# Patient Record
Sex: FEMALE | Race: WHITE | NOT HISPANIC OR LATINO | Employment: FULL TIME | URBAN - METROPOLITAN AREA
[De-identification: names, ages, dates, MRNs, and addresses within clinical notes are randomized per-mention and may not be internally consistent; named-entity substitution may affect disease eponyms.]

---

## 2017-04-13 ENCOUNTER — HOSPITAL ENCOUNTER (EMERGENCY)
Facility: MEDICAL CENTER | Age: 52
End: 2017-04-13
Attending: EMERGENCY MEDICINE
Payer: COMMERCIAL

## 2017-04-13 ENCOUNTER — APPOINTMENT (OUTPATIENT)
Dept: RADIOLOGY | Facility: MEDICAL CENTER | Age: 52
End: 2017-04-13
Attending: EMERGENCY MEDICINE
Payer: COMMERCIAL

## 2017-04-13 VITALS
SYSTOLIC BLOOD PRESSURE: 138 MMHG | OXYGEN SATURATION: 96 % | HEART RATE: 78 BPM | HEIGHT: 69 IN | BODY MASS INDEX: 26.97 KG/M2 | DIASTOLIC BLOOD PRESSURE: 78 MMHG | WEIGHT: 182.1 LBS | RESPIRATION RATE: 16 BRPM | TEMPERATURE: 98.1 F

## 2017-04-13 DIAGNOSIS — M54.50 ACUTE LEFT-SIDED LOW BACK PAIN WITHOUT SCIATICA: ICD-10-CM

## 2017-04-13 DIAGNOSIS — R10.9 FLANK PAIN: ICD-10-CM

## 2017-04-13 LAB
ALBUMIN SERPL BCP-MCNC: 3.7 G/DL (ref 3.2–4.9)
ALBUMIN/GLOB SERPL: 1.1 G/DL
ALP SERPL-CCNC: 81 U/L (ref 30–99)
ALT SERPL-CCNC: 22 U/L (ref 2–50)
ANION GAP SERPL CALC-SCNC: 6 MMOL/L (ref 0–11.9)
APPEARANCE UR: CLEAR
AST SERPL-CCNC: 21 U/L (ref 12–45)
BASOPHILS # BLD AUTO: 1 % (ref 0–1.8)
BASOPHILS # BLD: 0.05 K/UL (ref 0–0.12)
BILIRUB SERPL-MCNC: 0.6 MG/DL (ref 0.1–1.5)
BILIRUB UR QL STRIP.AUTO: NEGATIVE
BUN SERPL-MCNC: 15 MG/DL (ref 8–22)
CALCIUM SERPL-MCNC: 8.4 MG/DL (ref 8.4–10.2)
CHLORIDE SERPL-SCNC: 108 MMOL/L (ref 96–112)
CO2 SERPL-SCNC: 24 MMOL/L (ref 20–33)
COLOR UR: YELLOW
CREAT SERPL-MCNC: 0.89 MG/DL (ref 0.5–1.4)
CULTURE IF INDICATED INDCX: NO UA CULTURE
EOSINOPHIL # BLD AUTO: 0.1 K/UL (ref 0–0.51)
EOSINOPHIL NFR BLD: 1.9 % (ref 0–6.9)
ERYTHROCYTE [DISTWIDTH] IN BLOOD BY AUTOMATED COUNT: 45 FL (ref 35.9–50)
GFR SERPL CREATININE-BSD FRML MDRD: >60 ML/MIN/1.73 M 2
GLOBULIN SER CALC-MCNC: 3.3 G/DL (ref 1.9–3.5)
GLUCOSE SERPL-MCNC: 106 MG/DL (ref 65–99)
GLUCOSE UR STRIP.AUTO-MCNC: NEGATIVE MG/DL
HCT VFR BLD AUTO: 42.3 % (ref 37–47)
HGB BLD-MCNC: 14.6 G/DL (ref 12–16)
IMM GRANULOCYTES # BLD AUTO: 0.01 K/UL (ref 0–0.11)
IMM GRANULOCYTES NFR BLD AUTO: 0.2 % (ref 0–0.9)
KETONES UR STRIP.AUTO-MCNC: NEGATIVE MG/DL
LEUKOCYTE ESTERASE UR QL STRIP.AUTO: NEGATIVE
LYMPHOCYTES # BLD AUTO: 1.42 K/UL (ref 1–4.8)
LYMPHOCYTES NFR BLD: 27.6 % (ref 22–41)
MCH RBC QN AUTO: 31.2 PG (ref 27–33)
MCHC RBC AUTO-ENTMCNC: 34.5 G/DL (ref 33.6–35)
MCV RBC AUTO: 90.4 FL (ref 81.4–97.8)
MICRO URNS: NORMAL
MONOCYTES # BLD AUTO: 0.46 K/UL (ref 0–0.85)
MONOCYTES NFR BLD AUTO: 8.9 % (ref 0–13.4)
NEUTROPHILS # BLD AUTO: 3.11 K/UL (ref 2–7.15)
NEUTROPHILS NFR BLD: 60.4 % (ref 44–72)
NITRITE UR QL STRIP.AUTO: NEGATIVE
NRBC # BLD AUTO: 0 K/UL
NRBC BLD AUTO-RTO: 0 /100 WBC
PH UR STRIP.AUTO: 5.5 [PH]
PLATELET # BLD AUTO: 229 K/UL (ref 164–446)
PMV BLD AUTO: 9.7 FL (ref 9–12.9)
POTASSIUM SERPL-SCNC: 3.8 MMOL/L (ref 3.6–5.5)
PROT SERPL-MCNC: 7 G/DL (ref 6–8.2)
PROT UR QL STRIP: NEGATIVE MG/DL
RBC # BLD AUTO: 4.68 M/UL (ref 4.2–5.4)
RBC UR QL AUTO: NEGATIVE
SODIUM SERPL-SCNC: 138 MMOL/L (ref 135–145)
SP GR UR STRIP.AUTO: 1.02
WBC # BLD AUTO: 5.2 K/UL (ref 4.8–10.8)

## 2017-04-13 PROCEDURE — 85025 COMPLETE CBC W/AUTO DIFF WBC: CPT

## 2017-04-13 PROCEDURE — 96374 THER/PROPH/DIAG INJ IV PUSH: CPT

## 2017-04-13 PROCEDURE — 81003 URINALYSIS AUTO W/O SCOPE: CPT

## 2017-04-13 PROCEDURE — 94760 N-INVAS EAR/PLS OXIMETRY 1: CPT

## 2017-04-13 PROCEDURE — 700111 HCHG RX REV CODE 636 W/ 250 OVERRIDE (IP): Performed by: EMERGENCY MEDICINE

## 2017-04-13 PROCEDURE — 80053 COMPREHEN METABOLIC PANEL: CPT

## 2017-04-13 PROCEDURE — 74176 CT ABD & PELVIS W/O CONTRAST: CPT

## 2017-04-13 PROCEDURE — 99284 EMERGENCY DEPT VISIT MOD MDM: CPT

## 2017-04-13 RX ORDER — METHYLPREDNISOLONE 4 MG/1
TABLET ORAL
Qty: 21 TAB | Refills: 0 | Status: SHIPPED | OUTPATIENT
Start: 2017-04-13 | End: 2018-06-28

## 2017-04-13 RX ORDER — KETOROLAC TROMETHAMINE 30 MG/ML
30 INJECTION, SOLUTION INTRAMUSCULAR; INTRAVENOUS ONCE
Status: COMPLETED | OUTPATIENT
Start: 2017-04-13 | End: 2017-04-13

## 2017-04-13 RX ORDER — IBUPROFEN 200 MG
200 TABLET ORAL EVERY 6 HOURS PRN
COMMUNITY

## 2017-04-13 RX ORDER — CYCLOBENZAPRINE HCL 10 MG
10 TABLET ORAL 3 TIMES DAILY PRN
Qty: 30 TAB | Refills: 0 | Status: SHIPPED | OUTPATIENT
Start: 2017-04-13

## 2017-04-13 RX ADMIN — KETOROLAC TROMETHAMINE 30 MG: 30 INJECTION, SOLUTION INTRAMUSCULAR; INTRAVENOUS at 05:57

## 2017-04-13 NOTE — ED AVS SNAPSHOT
Home Care Instructions                                                                                                                Sandy Rubio   MRN: [id number]    Department:  [facility], Emergency Dept   Date of Visit:  4/13/2017            [facility], Emergency Dept    [address]    [place] [address]    Phone:  [phone]      You were seen by     1. Irais Fox D.O.    2. Terrance King M.D.      Your Diagnosis Was     Flank pain     R10.9       These are the medications you received during your hospitalization from 04/13/2017 0523 to 04/13/2017 0639     Date/Time Order Dose Route Action    04/13/2017 0557 ketorolac (TORADOL) injection 30 mg Intravenous Given      Follow-up Information     1. Follow up with [facility], Emergency Dept.    Specialty:  Emergency Medicine    Why:  If symptoms worsen    Contact information    [address] [place] [id number] [phone]      Medication Information     Review all of your home medications and newly ordered medications with your primary doctor and/or pharmacist as soon as possible. Follow medication instructions as directed by your doctor and/or pharmacist.     Please keep your complete medication list with you and share with your physician. Update the information when medications are discontinued, doses are changed, or new medications (including over-the-counter products) are added; and carry medication information at all times in the event of emergency situations.               Medication List      START taking these medications        Instructions    Morning Afternoon Evening Bedtime    cyclobenzaprine 10 MG Tabs   Commonly known as:  FLEXERIL        Take 1 Tab by mouth 3 times a day as needed.   Dose:  10 mg                        methylPREDNISolone 4 MG Tabs   Commonly known as:  MEDROL        Take as per the package instructions.                          ASK your doctor about these medications        Instructions    Morning Afternoon Evening Bedtime    estradiol 0.05 MG/24HR Pttw   Commonly known as:  CARO PALACIOS        Apply 1 Patch to skin as directed every 7 days.   Dose:  1 Patch                        ibuprofen 200 MG Tabs   Commonly known as:  MOTRIN        Take 200 mg by mouth every 6 hours as needed.   Dose:  200 mg                             Where to Get Your Medications      You can get these medications from any pharmacy     Bring a paper prescription for each of these medications    - cyclobenzaprine 10 MG Tabs  - methylPREDNISolone 4 MG Tabs            Procedures and tests performed during your visit     CBC WITH DIFFERENTIAL    COMP METABOLIC PANEL    CT-RENAL COLIC EVALUATION(A/P W/O)    Cardiac Monitoring    ESTIMATED GFR    IV Saline Lock    Pulse Ox    URINALYSIS CULTURE, IF INDICATED        Discharge Instructions       Back Pain, Adult  Back pain is very common in adults. The cause of back pain is rarely dangerous and the pain often gets better over time. The cause of your back pain may not be known. Some common causes of back pain include:  · Strain of the muscles or ligaments supporting the spine.  · Wear and tear (degeneration) of the spinal disks.  · Arthritis.  · Direct injury to the back.  For many people, back pain may return. Since back pain is rarely dangerous, most people can learn to manage this condition on their own.  HOME CARE INSTRUCTIONS  Watch your back pain for any changes. The following actions may help to lessen any discomfort you are feeling:  · Remain active. It is stressful on your back to sit or  one place for long periods of time. Do not sit, drive, or  one place for more than 30 minutes at a time. Take short walks on even surfaces as soon as you are able. Try to increase the length of time you walk each day.  · Exercise regularly as directed by your health care provider. Exercise helps your back heal  faster. It also helps avoid future injury by keeping your muscles strong and flexible.  · Do not stay in bed. Resting more than 1-2 days can delay your recovery.  · Pay attention to your body when you bend and lift. The most comfortable positions are those that put less stress on your recovering back. Always use proper lifting techniques, including:  ¨ Bending your knees.  ¨ Keeping the load close to your body.  ¨ Avoiding twisting.  · Find a comfortable position to sleep. Use a firm mattress and lie on your side with your knees slightly bent. If you lie on your back, put a pillow under your knees.  · Avoid feeling anxious or stressed. Stress increases muscle tension and can worsen back pain. It is important to recognize when you are anxious or stressed and learn ways to manage it, such as with exercise.  · Take medicines only as directed by your health care provider. Over-the-counter medicines to reduce pain and inflammation are often the most helpful. Your health care provider may prescribe muscle relaxant drugs. These medicines help dull your pain so you can more quickly return to your normal activities and healthy exercise.  · Apply ice to the injured area:  ¨ Put ice in a plastic bag.  ¨ Place a towel between your skin and the bag.  ¨ Leave the ice on for 20 minutes, 2-3 times a day for the first 2-3 days. After that, ice and heat may be alternated to reduce pain and spasms.  · Maintain a healthy weight. Excess weight puts extra stress on your back and makes it difficult to maintain good posture.  SEEK MEDICAL CARE IF:  · You have pain that is not relieved with rest or medicine.  · You have increasing pain going down into the legs or buttocks.  · You have pain that does not improve in one week.  · You have night pain.  · You lose weight.  · You have a fever or chills.  SEEK IMMEDIATE MEDICAL CARE IF:   · You develop new bowel or bladder control problems.  · You have unusual weakness or numbness in your arms or  legs.  · You develop nausea or vomiting.  · You develop abdominal pain.  · You feel faint.     This information is not intended to replace advice given to you by your health care provider. Make sure you discuss any questions you have with your health care provider.     Document Released: 12/18/2006 Document Revised: 01/08/2016 Document Reviewed: 04/21/2015  [employer] Interactive Patient Education ©2016 [employer]            Patient Information     Patient Information    Following emergency treatment: all patient requiring follow-up care must return either to a private physician or a clinic if your condition worsens before you are able to obtain further medical attention, please return to the emergency room.     Billing Information    At [facility], we work to make the billing process streamlined for our patients.  Our Representatives are here to answer any questions you may have regarding your hospital bill.  If you have insurance coverage and have supplied your insurance information to us, we will submit a claim to your insurer on your behalf.  Should you have any questions regarding your bill, we can be reached online or by phone as follows:  Online: You are able pay your bills online or live chat with our representatives about any billing questions you may have. We are here to help Monday - Friday from 8:00am to 7:30pm and 9:00am - 12:00pm on Saturdays.  Please visit [email or web address]  for more information.   Phone:  [phone] or [phone]    Please note that your emergency physician, surgeon, pathologist, radiologist, anesthesiologist, and other specialists are not employed by [facility] and will therefore bill separately for their services.  Please contact them directly for any questions concerning their bills at the numbers below:     Emergency Physician Services:  [phone]  [facility]:  [phone]  [facility]:   [phone]  [facility]:  [phone]    1. Your final bill may vary from the amount quoted upon discharge if all procedures are not complete at that time, or if your doctor has additional procedures of which we are not aware. You will receive an additional bill if you return to the Emergency Department at [facility] for suture removal regardless of the facility of which the sutures were placed.     2. Please arrange for settlement of this account at the emergency registration.    3. All self-pay accounts are due in full at the time of treatment.  If you are unable to meet this obligation then payment is expected within 4-5 days.     4. If you have had radiology studies (CT, X-ray, Ultrasound, MRI), you have received a preliminary result during your emergency department visit. Please contact the radiology department [phone] to receive a copy of your final result. Please discuss the Final result with your primary physician or with the follow up physician provided.     Crisis Hotline:  [place] Crisis Hotline:  8-657-FNJUQPK or 1-693.811.3754  [place] Crisis Hotline:    1-864.623.3786 or 292-266-6358         ED Discharge Follow Up Questions    1. In order to provide you with very good care, we would like to follow up with a phone call in the next few days.  May we have your permission to contact you?     YES /  NO    2. What is the best phone number to call you? (       )_____-__________    3. What is the best time to call you?      Morning  /  Afternoon  /  Evening                   Patient Signature:  ____________________________________________________________    Date:  ____________________________________________________________

## 2017-04-13 NOTE — ED AVS SNAPSHOT
Fancorps Access Code: [email or web address]  Expires: 5/13/2017  6:39 AM    Fancorps  A secure, online tool to manage your health information     [employer] Fancorps® is a secure, online tool that connects you to your personalized health information from the privacy of your home -- day or night - making it very easy for you to manage your healthcare. Once the activation process is completed, you can even access your medical information using the Fancorps rita, which is available for free in the Apple Rita store or Google Play store.     Fancorps provides the following levels of access (as shown below):   My Chart Features   [facility] Primary Care Doctor [facility]  Specialists [facility]  Urgent  Care Non-[facility]  Primary Care  Doctor   Email your healthcare team securely and privately 24/7 X X X X   Manage appointments: schedule your next appointment; view details of past/upcoming appointments X      Request prescription refills. X      View recent personal medical records, including lab and immunizations X X X X   View health record, including health history, allergies, medications X X X X   Read reports about your outpatient visits, procedures, consult and ER notes X X X X   See your discharge summary, which is a recap of your hospital and/or ER visit that includes your diagnosis, lab results, and care plan. X X       How to register for Fancorps:  1. Go to  [email or web address].  2. Click on the Sign Up Now box, which takes you to the New Member Sign Up page. You will need to provide the following information:  a. Enter your Fancorps Access Code exactly as it appears at the top of this page. (You will not need to use this code after you’ve completed the sign-up process. If you do not sign up before the expiration date, you must request a new code.)   b. Enter your date of birth.   c. Enter your home email address.   d. Click Submit, and follow the next screen’s instructions.  3. Create a Fancorps ID. This will be your Fancorps  login ID and cannot be changed, so think of one that is secure and easy to remember.  4. Create a M[employer]friends password. You can change your password at any time.  5. Enter your Password Reset Question and Answer. This can be used at a later time if you forget your password.   6. Enter your e-mail address. This allows you to receive e-mail notifications when new information is available in Mappyfriends.  7. Click Sign Up. You can now view your health information.    For assistance activating your M[employer]friends account, call [phone]

## 2017-04-13 NOTE — ED NOTES
Chief Complaint   Patient presents with   • Flank Pain     Left, started a couple days ago, progressively worse, 8-9/10 pain   • Constipation     Last small BM yesterday AM, has been taking stool softeners and miralax, Hx of constipation

## 2017-04-13 NOTE — DISCHARGE INSTRUCTIONS
Back Pain, Adult  Back pain is very common in adults. The cause of back pain is rarely dangerous and the pain often gets better over time. The cause of your back pain may not be known. Some common causes of back pain include:  · Strain of the muscles or ligaments supporting the spine.  · Wear and tear (degeneration) of the spinal disks.  · Arthritis.  · Direct injury to the back.  For many people, back pain may return. Since back pain is rarely dangerous, most people can learn to manage this condition on their own.  HOME CARE INSTRUCTIONS  Watch your back pain for any changes. The following actions may help to lessen any discomfort you are feeling:  · Remain active. It is stressful on your back to sit or  one place for long periods of time. Do not sit, drive, or  one place for more than 30 minutes at a time. Take short walks on even surfaces as soon as you are able. Try to increase the length of time you walk each day.  · Exercise regularly as directed by your health care provider. Exercise helps your back heal faster. It also helps avoid future injury by keeping your muscles strong and flexible.  · Do not stay in bed. Resting more than 1-2 days can delay your recovery.  · Pay attention to your body when you bend and lift. The most comfortable positions are those that put less stress on your recovering back. Always use proper lifting techniques, including:  ¨ Bending your knees.  ¨ Keeping the load close to your body.  ¨ Avoiding twisting.  · Find a comfortable position to sleep. Use a firm mattress and lie on your side with your knees slightly bent. If you lie on your back, put a pillow under your knees.  · Avoid feeling anxious or stressed. Stress increases muscle tension and can worsen back pain. It is important to recognize when you are anxious or stressed and learn ways to manage it, such as with exercise.  · Take medicines only as directed by your health care provider. Over-the-counter  medicines to reduce pain and inflammation are often the most helpful. Your health care provider may prescribe muscle relaxant drugs. These medicines help dull your pain so you can more quickly return to your normal activities and healthy exercise.  · Apply ice to the injured area:  ¨ Put ice in a plastic bag.  ¨ Place a towel between your skin and the bag.  ¨ Leave the ice on for 20 minutes, 2-3 times a day for the first 2-3 days. After that, ice and heat may be alternated to reduce pain and spasms.  · Maintain a healthy weight. Excess weight puts extra stress on your back and makes it difficult to maintain good posture.  SEEK MEDICAL CARE IF:  · You have pain that is not relieved with rest or medicine.  · You have increasing pain going down into the legs or buttocks.  · You have pain that does not improve in one week.  · You have night pain.  · You lose weight.  · You have a fever or chills.  SEEK IMMEDIATE MEDICAL CARE IF:   · You develop new bowel or bladder control problems.  · You have unusual weakness or numbness in your arms or legs.  · You develop nausea or vomiting.  · You develop abdominal pain.  · You feel faint.     This information is not intended to replace advice given to you by your health care provider. Make sure you discuss any questions you have with your health care provider.     Document Released: 12/18/2006 Document Revised: 01/08/2016 Document Reviewed: 04/21/2015  [employer] Interactive Patient Education ©2016 [employer]

## 2017-04-13 NOTE — ED NOTES
Discharge instructions provided.  Rx x2 given and educated on how and when to take medications, Pt verbalized the understanding of discharge instructions to follow up with PCP and to return to ER if condition worsens.  Pt ambulated out of ER without difficulty.

## 2017-04-13 NOTE — ED PROVIDER NOTES
ED Provider Note    CHIEF COMPLAINT  Chief Complaint   Patient presents with   • Flank Pain     Left, started a couple days ago, progressively worse, 8-9/10 pain   • Constipation     Last small BM yesterday AM, has been taking stool softeners and miralax, Hx of constipation, hysterectomy, and appendectomy       HPI  Sandy Rubio is a 51 y.o. female who presents to the emergency department with a chief complaint of left flank pain/low back pain. The patient states the pain started several days ago. It's been intermittent since the time of onset. She does not recall any specific injury. She describes as a sharp stabbing crampy type pain. It started off as fairly mild but his progress to being fairly severe. She does not have any associated vomiting. No dysuria. No hematuria. No fevers. The patient thought that she may be constipated. She took some stool softeners and some laxatives hoping that this would relieve her symptoms. However despite these treatments her symptoms have continued.    REVIEW OF SYSTEMS  See HPI for further details. All other systems are negative.     PAST MEDICAL HISTORY  Past Medical History   Diagnosis Date   • Pain      migraine from back surgery   • Other specified disorder of intestines      ocas indigestion   • Bronchitis    • Other specified symptom associated with female genital organs        FAMILY HISTORY  History reviewed. No pertinent family history.    SOCIAL HISTORY  Social History     Social History   • Marital Status: Single     Spouse Name: N/A   • Number of Children: N/A   • Years of Education: N/A     Social History Main Topics   • Smoking status: Current Every Day Smoker -- 0.50 packs/day     Types: Cigarettes   • Smokeless tobacco: None      Comment: 1/2 ppd   • Alcohol Use: Yes      Comment: rare   • Drug Use: No   • Sexual Activity: Not Asked     Other Topics Concern   • None     Social History Narrative       SURGICAL HISTORY  Past Surgical History   Procedure  "Laterality Date   • Lumbar laminectomy diskectomy  1978     back   • Appendectomy       ruptured   • Arthroscopy, knee       right   • Other neurological surg     • Lumbar decompression     • Laparoscopic lysis of adhesions  4/4/2014     Performed by Power Bautista M.D. at [facility]   • Salpingo oophorectomy  4/4/2014     Performed by Power Bautista M.D. at [facility]   • Cystoscopy  4/4/2014     Performed by Power Bautista M.D. at [facility]   • Hysterectomy laparoscopy  4/4/2014     Performed by Power Bautista M.D. at [facility]       CURRENT MEDICATIONS  Home Medications     Reviewed by Whitney Rosa R.N. (Registered Nurse) on 04/13/17 at 0540  Med List Status: Complete    Medication Last Dose Status    estradiol (VIVELLE DOT) 0.05 MG/24HR PTTW 4/11/2017 Active    ibuprofen (MOTRIN) 200 MG Tab 4/12/2017 Active                ALLERGIES  No Known Allergies    PHYSICAL EXAM  VITAL SIGNS: /96 mmHg  Pulse 72  Temp(Src) 36.8 °C (98.3 °F)  Resp 16  Ht 1.753 m (5' 9\")  Wt 82.6 kg (182 lb 1.6 oz)  BMI 26.88 kg/m2  SpO2 96%  LMP        Constitutional: Well developed, Well nourished, No acute distress, Non-toxic appearance.   Neck: Normal range of motion, No tenderness, Supple, No stridor.   Cardiovascular: Normal heart rate, Normal rhythm, No murmurs, No rubs, No gallops. No pulsatile masses.  Thorax & Lungs: Normal breath sounds, No respiratory distress, No wheezing, No chest tenderness.   Abdomen: Bowel sounds normal, Soft, No tenderness, No masses, No pulsatile masses.   Skin: Warm, Dry, No erythema, No rash.   Back: Tenderness to palpation in the left CVA region and left lumbar paraspinal musculature. There is no midline spinal tenderness to palpation.  Extremities: Intact distal pulses, No edema, No tenderness, No cyanosis, No clubbing.   Neurologic: Alert & oriented x 3, Normal motor function, Normal sensory function, No focal deficits " noted.     EKG      RADIOLOGY/PROCEDURES  CT-RENAL COLIC EVALUATION(A/P W/O)   Final Result      Negative noncontrast CT of the abdomen and pelvis.      Prior appendectomy        Results for orders placed or performed during the hospital encounter of 04/13/17   CBC WITH DIFFERENTIAL   Result Value Ref Range    WBC 5.2 4.8 - 10.8 K/uL    RBC 4.68 4.20 - 5.40 M/uL    Hemoglobin 14.6 12.0 - 16.0 g/dL    Hematocrit 42.3 37.0 - 47.0 %    MCV 90.4 81.4 - 97.8 fL    MCH 31.2 27.0 - 33.0 pg    MCHC 34.5 33.6 - 35.0 g/dL    RDW 45.0 35.9 - 50.0 fL    Platelet Count 229 164 - 446 K/uL    MPV 9.7 9.0 - 12.9 fL    Neutrophils-Polys 60.40 44.00 - 72.00 %    Lymphocytes 27.60 22.00 - 41.00 %    Monocytes 8.90 0.00 - 13.40 %    Eosinophils 1.90 0.00 - 6.90 %    Basophils 1.00 0.00 - 1.80 %    Immature Granulocytes 0.20 0.00 - 0.90 %    Nucleated RBC 0.00 /100 WBC    Neutrophils (Absolute) 3.11 2.00 - 7.15 K/uL    Lymphs (Absolute) 1.42 1.00 - 4.80 K/uL    Monos (Absolute) 0.46 0.00 - 0.85 K/uL    Eos (Absolute) 0.10 0.00 - 0.51 K/uL    Baso (Absolute) 0.05 0.00 - 0.12 K/uL    Immature Granulocytes (abs) 0.01 0.00 - 0.11 K/uL    NRBC (Absolute) 0.00 K/uL   COMP METABOLIC PANEL   Result Value Ref Range    Sodium 138 135 - 145 mmol/L    Potassium 3.8 3.6 - 5.5 mmol/L    Chloride 108 96 - 112 mmol/L    Co2 24 20 - 33 mmol/L    Anion Gap 6.0 0.0 - 11.9    Glucose 106 (H) 65 - 99 mg/dL    Bun 15 8 - 22 mg/dL    Creatinine 0.89 0.50 - 1.40 mg/dL    Calcium 8.4 8.4 - 10.2 mg/dL    AST(SGOT) 21 12 - 45 U/L    ALT(SGPT) 22 2 - 50 U/L    Alkaline Phosphatase 81 30 - 99 U/L    Total Bilirubin 0.6 0.1 - 1.5 mg/dL    Albumin 3.7 3.2 - 4.9 g/dL    Total Protein 7.0 6.0 - 8.2 g/dL    Globulin 3.3 1.9 - 3.5 g/dL    A-G Ratio 1.1 g/dL   URINALYSIS CULTURE, IF INDICATED   Result Value Ref Range    Color Yellow     Character Clear     Specific Gravity 1.025 <1.035    Ph 5.5 5.0-8.0    Glucose Negative Negative mg/dL    Ketones Negative Negative mg/dL     Protein Negative Negative mg/dL    Bilirubin Negative Negative    Nitrite Negative Negative    Leukocyte Esterase Negative Negative    Occult Blood Negative Negative    Micro Urine Req see below     Culture Indicated No UA Culture   ESTIMATED GFR   Result Value Ref Range    GFR If African American >60 >60 mL/min/1.73 m 2    GFR If Non African American >60 >60 mL/min/1.73 m 2         COURSE & MEDICAL DECISION MAKING  Pertinent Labs & Imaging studies reviewed. (See chart for details)    The patient presents today with left flank pain/left low back pain. Urinalysis does not reveal any evidence of infection. CT scan demonstrates no evidence of ureterolithiasis. Metabolic panels markable for normal electrolytes. White blood cell count is normal.    Overall the patient's physical examination is reassuring. Feel that her symptoms are likely musculoskeletal in nature. The patient works as a  at [employer]. I will give the patient a prescription for Medrol and Flexeril. She has been instructed that she is not to take Flexeril within 12 hours of going to work. She verbalizes understanding. The patient will be given a work note for 2 days off. Condition the patient has weekend off. She will return to work on Monday.    The patient will return for new or worsening symptoms and is stable at the time of discharge.    The patient is referred to a primary physician for blood pressure management, diabetic screening, and for all other preventative health concerns.    DISPOSITION:  Patient will be discharged home in stable condition.    FOLLOW UP:  [facility], Emergency Dept  [address]  [place] [address]  [phone]    If symptoms worsen      OUTPATIENT MEDICATIONS:  New Prescriptions    CYCLOBENZAPRINE (FLEXERIL) 10 MG TAB    Take 1 Tab by mouth 3 times a day as needed.    METHYLPREDNISOLONE (MEDROL) 4 MG TAB    Take as per the package instructions.            FINAL IMPRESSION  1. Flank pain    2. Acute left-sided low back pain without sciatica        Electronically signed by: Terrance King, 4/13/2017 6:35 AM

## 2017-04-13 NOTE — ED AVS SNAPSHOT
4/13/2017    Sandy Staceyalcon Rubio  [address] [address]    Dear Sandy:    [facility] wants to ensure your discharge home is safe and you or your loved ones have had all of your questions answered regarding your care after you leave the hospital.    Below is a list of resources and contact information should you have any questions regarding your hospital stay, follow-up instructions, or active medical symptoms.    Questions or Concerns Regarding… Contact   Medical Questions Related to Your Discharge  (7 days a week, 8am-5pm) Contact a Nurse Care Coordinator   [phone]   Medical Questions Not Related to Your Discharge  (24 hours a day / 7 days a week)  Contact the Nurse Health Line   [phone]    Medications or Discharge Instructions Refer to your discharge packet   or contact your [facility] Primary Care Provider   [phone]   Follow-up Appointment(s) Schedule your appointment via [employer]   or contact Scheduling [phone]   Billing Review your statement via [employer]  or contact Billing [phone]   Medical Records Review your records via [employer]   or contact Medical Records [phone]     You may receive a telephone call within two days of discharge. This call is to make certain you understand your discharge instructions and have the opportunity to have any questions answered. You can also easily access your medical information, test results and upcoming appointments via the [employer] free online health management tool. You can learn more and sign up at [employer]. For assistance setting up your [employer] account, please call [phone].    Once again, we want to ensure your discharge home is safe and that you have a clear understanding of any next steps in your care. If you have any questions or concerns, please do not hesitate to contact us, we are here for you. Thank you for choosing [facility] for your healthcare needs.    Sincerely,    Your [facility] Healthcare Team

## 2017-07-06 ENCOUNTER — OFFICE VISIT (OUTPATIENT)
Dept: URGENT CARE | Facility: CLINIC | Age: 52
End: 2017-07-06
Payer: COMMERCIAL

## 2017-07-06 ENCOUNTER — HOSPITAL ENCOUNTER (EMERGENCY)
Facility: MEDICAL CENTER | Age: 52
End: 2017-07-06
Attending: EMERGENCY MEDICINE
Payer: COMMERCIAL

## 2017-07-06 VITALS
DIASTOLIC BLOOD PRESSURE: 102 MMHG | SYSTOLIC BLOOD PRESSURE: 170 MMHG | OXYGEN SATURATION: 97 % | TEMPERATURE: 98.8 F | RESPIRATION RATE: 14 BRPM | HEIGHT: 69 IN | HEART RATE: 81 BPM | BODY MASS INDEX: 26.81 KG/M2 | WEIGHT: 181 LBS

## 2017-07-06 VITALS
WEIGHT: 180.56 LBS | DIASTOLIC BLOOD PRESSURE: 88 MMHG | TEMPERATURE: 97.3 F | BODY MASS INDEX: 26.74 KG/M2 | SYSTOLIC BLOOD PRESSURE: 140 MMHG | HEIGHT: 69 IN | HEART RATE: 70 BPM | RESPIRATION RATE: 16 BRPM | OXYGEN SATURATION: 96 %

## 2017-07-06 DIAGNOSIS — R45.2 UNHAPPINESS: ICD-10-CM

## 2017-07-06 DIAGNOSIS — I10 ESSENTIAL HYPERTENSION: ICD-10-CM

## 2017-07-06 DIAGNOSIS — M79.606 LEG PAIN, ANTERIOR, UNSPECIFIED LATERALITY: ICD-10-CM

## 2017-07-06 DIAGNOSIS — F41.8 SITUATIONAL ANXIETY: ICD-10-CM

## 2017-07-06 DIAGNOSIS — R60.9 PERIPHERAL EDEMA: ICD-10-CM

## 2017-07-06 DIAGNOSIS — L55.9 SUNBURN: ICD-10-CM

## 2017-07-06 PROCEDURE — 99283 EMERGENCY DEPT VISIT LOW MDM: CPT

## 2017-07-06 PROCEDURE — 99203 OFFICE O/P NEW LOW 30 MIN: CPT | Performed by: PHYSICIAN ASSISTANT

## 2017-07-06 PROCEDURE — 93970 EXTREMITY STUDY: CPT

## 2017-07-06 RX ORDER — IBUPROFEN 600 MG/1
600 TABLET ORAL ONCE
Status: DISCONTINUED | OUTPATIENT
Start: 2017-07-06 | End: 2017-07-06 | Stop reason: HOSPADM

## 2017-07-06 ASSESSMENT — LIFESTYLE VARIABLES: DO YOU DRINK ALCOHOL: NO

## 2017-07-06 ASSESSMENT — PAIN SCALES - GENERAL
PAINLEVEL_OUTOF10: 5
PAINLEVEL_OUTOF10: 4

## 2017-07-06 ASSESSMENT — ENCOUNTER SYMPTOMS: LEG SWELLING: 1

## 2017-07-06 NOTE — ED AVS SNAPSHOT
Home Care Instructions                                                                                                                Sandy Rubio   MRN: [id number]    Department:  [facility], Emergency Dept   Date of Visit:  7/6/2017            [facility], Emergency Dept    [address]    [place] [address]    Phone:  [phone]      You were seen by     Harry Singh M.D.      Your Diagnosis Was     Leg pain, anterior, unspecified laterality     M79.606       These are the medications you received during your hospitalization from 07/06/2017 1946 to 07/06/2017 2131     Date/Time Order Dose Route Action    07/06/2017 2045 ibuprofen (MOTRIN) tablet 600 mg 600 mg Oral Refused      Medication Information     Review all of your home medications and newly ordered medications with your primary doctor and/or pharmacist as soon as possible. Follow medication instructions as directed by your doctor and/or pharmacist.     Please keep your complete medication list with you and share with your physician. Update the information when medications are discontinued, doses are changed, or new medications (including over-the-counter products) are added; and carry medication information at all times in the event of emergency situations.               Medication List      ASK your doctor about these medications        Instructions    Morning Afternoon Evening Bedtime    cyclobenzaprine 10 MG Tabs   Commonly known as:  FLEXERIL        Take 1 Tab by mouth 3 times a day as needed.   Dose:  10 mg                        estradiol 0.05 MG/24HR Pttw   Commonly known as:  VIVELLE DOT        Apply 1 Patch to skin as directed every 7 days.   Dose:  1 Patch                        ibuprofen 200 MG Tabs   Commonly known as:  MOTRIN        Take 200 mg by mouth every 6 hours as needed.   Dose:  200 mg                        methylPREDNISolone 4 MG Tabs   Commonly known as:  MEDROL        Take as  per the package instructions.                                Procedures and tests performed during your visit     LE VENOUS DUPLEX (Specify in Comments Left, Right Or Bilateral)        Discharge Instructions       The ultrasound of both of her lower legs was normal. There is no evidence of clot or any other abnormality. You discomfort is almost certainly from your sunburn. Take 600 mg of ibuprofen every 6 hours as needed. Aloe vera gel, applied the skin, can also be extremely helpful.     Sunburn  Sunburn is damage to the skin caused by overexposure to ultraviolet (UV) rays. People with light skin or a fair complexion may be more susceptible to sunburn. Repeated sun exposure causes early skin aging such as wrinkles and sun spots. It also increases the risk of skin cancer.  CAUSES  A sunburn is caused by getting too much UV radiation from the sun.  SYMPTOMS  · Red or pink skin.  · Soreness and swelling.  · Pain.  · Blisters.  · Peeling skin.  · Headache, fever, and fatigue if sunburn covers a large area.  TREATMENT  · Your caregiver may tell you to take certain medicines to lessen inflammation.  · Your caregiver may have you use hydrocortisone cream or spray to help with itching and inflammation.  · Your caregiver may prescribe an antibiotic cream to use on blisters.  HOME CARE INSTRUCTIONS   · Avoid further exposure to the sun.  · Cool baths and cool compresses may be helpful if used several times per day. Do not apply ice, since this may result in more damage to the skin.  · Only take over-the-counter or prescription medicines for pain, discomfort, or fever as directed by your caregiver.  · Use aloe or other over-the-counter sunburn creams or gels on your skin. Do not apply these creams or gels on blisters.  · Drink enough fluids to keep your urine clear or pale yellow.  · Do not break blisters. If blisters break, your caregiver may recommend an antibiotic cream to apply to the affected area.  PREVENTION   · Try to  avoid the sun between 10:00 a.m. and 4:00 p.m. when it is the strongest.  · Apply sunscreen at least 30 minutes before exposure to the sun.  · Always wear protective hats, clothing, and sunglasses with UV protection.  · Avoid medicines, herbs, and foods that increase your sensitivity to sunlight.  · Avoid tanning beds.  SEEK IMMEDIATE MEDICAL CARE IF:   · You have a fever.  · Your pain is uncontrolled with medicine.  · You start to vomit or have diarrhea.  · You feel faint or develop a headache with confusion.  · You develop severe blistering.  · You have a pus-like (purulent) discharge coming from the blisters.   · Your burn becomes more painful and swollen.  MAKE SURE YOU:  · Understand these instructions.  · Will watch your condition.  · Will get help right away if you are not doing well or get worse.     This information is not intended to replace advice given to you by your health care provider. Make sure you discuss any questions you have with your health care provider.     Document Released: 09/27/2006 Document Revised: 04/14/2014 Document Reviewed: 06/10/2012  [employer]You Interactive Patient Education ©2016 [employer]            Patient Information     Patient Information    Following emergency treatment: all patient requiring follow-up care must return either to a private physician or a clinic if your condition worsens before you are able to obtain further medical attention, please return to the emergency room.     Billing Information    At [facility], we work to make the billing process streamlined for our patients.  Our Representatives are here to answer any questions you may have regarding your hospital bill.  If you have insurance coverage and have supplied your insurance information to us, we will submit a claim to your insurer on your behalf.  Should you have any questions regarding your bill, we can be reached online or by phone as follows:  Online: You are able pay your bills online or live chat  with our representatives about any billing questions you may have. We are here to help Monday - Friday from 8:00am to 7:30pm and 9:00am - 12:00pm on Saturdays.  Please visit [email or web address]  for more information.   Phone:  [phone] or [phone]    Please note that your emergency physician, surgeon, pathologist, radiologist, anesthesiologist, and other specialists are not employed by [facility] and will therefore bill separately for their services.  Please contact them directly for any questions concerning their bills at the numbers below:     Emergency Physician Services:  [phone]  [facility]:  [phone]  [facility]:  [phone]  [facility]:  [phone]    1. Your final bill may vary from the amount quoted upon discharge if all procedures are not complete at that time, or if your doctor has additional procedures of which we are not aware. You will receive an additional bill if you return to the Emergency Department at [facility] for suture removal regardless of the facility of which the sutures were placed.     2. Please arrange for settlement of this account at the emergency registration.    3. All self-pay accounts are due in full at the time of treatment.  If you are unable to meet this obligation then payment is expected within 4-5 days.     4. If you have had radiology studies (CT, X-ray, Ultrasound, MRI), you have received a preliminary result during your emergency department visit. Please contact the radiology department [phone] to receive a copy of your final result. Please discuss the Final result with your primary physician or with the follow up physician provided.     Crisis Hotline:  [place] Crisis Hotline:  4-625-UIZTBFB or 1-314.380.3931  [place] Crisis Hotline:    1-308.814.1602 or 754-339-8366         ED Discharge Follow Up Questions    1. In order to provide you with very good care,  we would like to follow up with a phone call in the next few days.  May we have your permission to contact you?     YES /  NO    2. What is the best phone number to call you? (       )_____-__________    3. What is the best time to call you?      Morning  /  Afternoon  /  Evening                   Patient Signature:  ____________________________________________________________    Date:  ____________________________________________________________

## 2017-07-06 NOTE — ED AVS SNAPSHOT
Wenwo Access Code: Activation code not generated  Current Wenwo Status: Active    [employer]  A secure, online tool to manage your health information     [employer]’s Wenwo® is a secure, online tool that connects you to your personalized health information from the privacy of your home -- day or night - making it very easy for you to manage your healthcare. Once the activation process is completed, you can even access your medical information using the Wenwo rita, which is available for free in the Apple Rita store or Google Play store.     Wenwo provides the following levels of access (as shown below):   My Chart Features   [facility] Primary Care Doctor [facility]  Specialists [facility]  Urgent  Care Non-[facility]  Primary Care  Doctor   Email your healthcare team securely and privately 24/7 X X X X   Manage appointments: schedule your next appointment; view details of past/upcoming appointments X      Request prescription refills. X      View recent personal medical records, including lab and immunizations X X X X   View health record, including health history, allergies, medications X X X X   Read reports about your outpatient visits, procedures, consult and ER notes X X X X   See your discharge summary, which is a recap of your hospital and/or ER visit that includes your diagnosis, lab results, and care plan. X X       How to register for Wenwo:  1. Go to  [email or web address].  2. Click on the Sign Up Now box, which takes you to the New Member Sign Up page. You will need to provide the following information:  a. Enter your Wenwo Access Code exactly as it appears at the top of this page. (You will not need to use this code after you’ve completed the sign-up process. If you do not sign up before the expiration date, you must request a new code.)   b. Enter your date of birth.   c. Enter your home email address.   d. Click Submit, and follow the next screen’s instructions.  3. Create a Wenwo ID. This will  be your [employer] login ID and cannot be changed, so think of one that is secure and easy to remember.  4. Create a [employer] password. You can change your password at any time.  5. Enter your Password Reset Question and Answer. This can be used at a later time if you forget your password.   6. Enter your e-mail address. This allows you to receive e-mail notifications when new information is available in [employer].  7. Click Sign Up. You can now view your health information.    For assistance activating your [employer] account, call [phone]

## 2017-07-06 NOTE — ED AVS SNAPSHOT
7/6/2017    Sandy Staceyalcon Rubio  [address] [address]    Dear Sandy:    [facility] wants to ensure your discharge home is safe and you or your loved ones have had all of your questions answered regarding your care after you leave the hospital.    Below is a list of resources and contact information should you have any questions regarding your hospital stay, follow-up instructions, or active medical symptoms.    Questions or Concerns Regarding… Contact   Medical Questions Related to Your Discharge  (7 days a week, 8am-5pm) Contact a Nurse Care Coordinator   [phone]   Medical Questions Not Related to Your Discharge  (24 hours a day / 7 days a week)  Contact the Nurse Health Line   [phone]    Medications or Discharge Instructions Refer to your discharge packet   or contact your [facility] Primary Care Provider   [phone]   Follow-up Appointment(s) Schedule your appointment via [employer]   or contact Scheduling [phone]   Billing Review your statement via [employer]  or contact Billing [phone]   Medical Records Review your records via [employer]   or contact Medical Records [phone]     You may receive a telephone call within two days of discharge. This call is to make certain you understand your discharge instructions and have the opportunity to have any questions answered. You can also easily access your medical information, test results and upcoming appointments via the [employer] free online health management tool. You can learn more and sign up at [employer]Exchange. For assistance setting up your [employer] account, please call [phone].    Once again, we want to ensure your discharge home is safe and that you have a clear understanding of any next steps in your care. If you have any questions or concerns, please do not hesitate to contact us, we are here for you. Thank you for choosing [facility] for your healthcare needs.    Sincerely,    Your [facility] Healthcare Team

## 2017-07-07 ASSESSMENT — ENCOUNTER SYMPTOMS
TINGLING: 0
VOMITING: 0
COUGH: 0
PALPITATIONS: 0
CHILLS: 0
FEVER: 0
DIZZINESS: 0
BRUISES/BLEEDS EASILY: 0
SENSORY CHANGE: 0
WHEEZING: 0
NAUSEA: 0
SHORTNESS OF BREATH: 0
SPUTUM PRODUCTION: 0
FOCAL WEAKNESS: 0

## 2017-07-07 NOTE — ED NOTES
Pt ambulatory to triage reports being sent from urgent care for bilt LE pain/ swelling x 2 days after kayaking. Pt's skin appears sunburned. Pt reports pain is mostly in bilat shins. No pitting edema noted, distal CMS intact. Pt denies chest discomfort or SOB. Educated on triage process, encouraged to inform staff of any changes.

## 2017-07-07 NOTE — PROGRESS NOTES
Subjective:      Sandy Rubio is a 52 y.o. female who presents with Leg Swelling            Leg Swelling  Pertinent negatives include no chest pain, chills, coughing, fever, nausea, rash or vomiting.   stands all day at work, notes increased calf swelling and pain over last 2d, denies PMH of similar, achy pain to bilat calves, did have lot's of sun on Saturday, got some sunburn to bilat legs, anteriorly while kayaking, thinks BP is elevated due to calve pain, denies PMH of HTN, denies PMH of blood clots/DVT/PE, or any known bleeding disorder, c/o few areas of tender firm nodules to calves as well, mother was adopted denies known FHX, denies chest pain/palp/dizziness/dyspnea,  C/o pain to bilat calves, has been txing sunburn w/ aloe. Concerned w/ BP and bilat leg swelling and pain, notes PMH of needing to elevate legs to releve daily edema to LE's, this is much worse per her approximation.      Review of Systems   Constitutional: Negative for fever and chills.   Respiratory: Negative for cough, sputum production, shortness of breath and wheezing.    Cardiovascular: Positive for leg swelling. Negative for chest pain and palpitations.   Gastrointestinal: Negative for nausea and vomiting.   Musculoskeletal: Negative for joint pain ( POS for ttp to bilat calves).   Skin: Negative for rash.   Neurological: Negative for dizziness, tingling, sensory change and focal weakness.   Endo/Heme/Allergies: Does not bruise/bleed easily.       PMH:  has a past medical history of Pain; Other specified disorder of intestines; Bronchitis; and Other specified symptom associated with female genital organs. She also has no past medical history of Unspecified hemorrhagic conditions, Personal history of venous thrombosis and embolism, Cancer (CMS-HCC), Diabetes, Infectious disease, Unspecified disorder of thyroid, Psychiatric problem, Glaucoma, CATARACT, Stroke (CMS-HCC), Seizure (CMS-formerly [facility]), Congestive heart failure (CMS-HCC),  "Myocardial infarct (CMS-HCC), Hypertension, Arrhythmia, Pacemaker, Angina, Heart valve disease, Heart murmur, Rheumatic fever, ASTHMA, Pneumonia, COPD, Jaundice, Indigestion, Unspecified urinary incontinence, Renal disorder, Dialysis, Arthritis, or Backpain.  MEDS:   Current outpatient prescriptions:   •  ibuprofen (MOTRIN) 200 MG Tab, Take 200 mg by mouth every 6 hours as needed., Disp: , Rfl:   •  methylPREDNISolone (MEDROL) 4 MG Tab, Take as per the package instructions., Disp: 21 Tab, Rfl: 0  •  cyclobenzaprine (FLEXERIL) 10 MG Tab, Take 1 Tab by mouth 3 times a day as needed., Disp: 30 Tab, Rfl: 0  •  estradiol (VIVELLE DOT) 0.05 MG/24HR PTTW, Apply 1 Patch to skin as directed every 7 days., Disp: , Rfl:   ALLERGIES: No Known Allergies  SURGHX:   Past Surgical History   Procedure Laterality Date   • Lumbar laminectomy diskectomy  1978     back   • Appendectomy       ruptured   • Arthroscopy, knee       right   • Other neurological surg     • Lumbar decompression     • Laparoscopic lysis of adhesions  4/4/2014     Performed by Power Bautista M.D. at [facility]   • Salpingo oophorectomy  4/4/2014     Performed by Power Bautista M.D. at [facility]   • Cystoscopy  4/4/2014     Performed by Power Bautista M.D. at [facility]   • Hysterectomy laparoscopy  4/4/2014     Performed by Power Bautista M.D. at [facility]     SOCHX:  reports that she has been smoking Cigarettes.  She has been smoking about 0.50 packs per day. She does not have any smokeless tobacco history on file. She reports that she drinks alcohol. She reports that she does not use illicit drugs.  FH: Family history was reviewed, no pertinent findings to report     Objective:     /102 mmHg  Pulse 81  Temp(Src) 37.1 °C (98.8 °F)  Resp 14  Ht 1.753 m (5' 9\")  Wt 82.101 kg (181 lb)  BMI 26.72 kg/m2  SpO2 97%     Physical Exam   Constitutional: She is oriented to person, place, and time. " She appears well-developed and well-nourished. No distress.   HENT:   Head: Normocephalic and atraumatic.   Right Ear: External ear normal.   Left Ear: External ear normal.   Nose: Nose normal.   Eyes: Conjunctivae and lids are normal. Right eye exhibits no discharge. Left eye exhibits no discharge. No scleral icterus.   Neck: Neck supple.   Cardiovascular: Normal rate, regular rhythm and intact distal pulses.   No extrasystoles are present.   Pulmonary/Chest: Effort normal and breath sounds normal. No respiratory distress. She has no decreased breath sounds. She has no wheezes. She has no rhonchi. She has no rales.   Musculoskeletal: Normal range of motion.        Right lower leg: She exhibits tenderness and edema ( trace to mild LE edema, no pitting edema, symmetric to contralateral ). She exhibits no bony tenderness, no swelling, no deformity and no laceration.        Left lower leg: She exhibits tenderness and edema (  trace to mild LE edema, no pitting edema, symmetric to contralateral ). She exhibits no bony tenderness, no swelling, no deformity and no laceration.   Neurological: She is alert and oriented to person, place, and time. She is not disoriented.   Skin: Skin is warm and dry. She is not diaphoretic. No erythema. No pallor.   Psychiatric: Her speech is normal and behavior is normal.   Nursing note and vitals reviewed.       we complete an AMA, I discuss w/ pt the risks of driving self to ER rather than EMS transport    Assessment/Plan:     1. Peripheral edema  Pt w/ no PMH of HTN, and only mild PMH of LE edema daily, w/ possible new onset HTN, inability to get imaging studies at this time of day, I feel this pt is best evaluated at [facility] to determine if further care is needed today    Patient has been directed to [facility] for further management/work up, I have reiterated to patient that although a provider to provider transfer was made this will not necessarily expedite the ER process, I  have spoken to ER Charge Rn (after 2 attempts to ring through to MD line w/ no answer) at [facility] ER regarding patient en route by private vehicle    - SIRENA AMA/Refusal of Treatment    2. Essential hypertension    - UC AMA/Refusal of Treatment

## 2017-07-07 NOTE — ED NOTES
Pt arrived to ED via POV for c/o bilateral leg pain s/p spending Saturday and Sunday on a kayak all day.  Pt states she went to work as usual last night and had pain that she has never had before.

## 2017-07-07 NOTE — ED NOTES
Discharge instructions reviewed.  Pt verbalizes understanding and denies questions.  VS stable.  NAD noted.  Respirations even and unlabored.  Steady gait noted upon ED exit.

## 2017-07-07 NOTE — ED PROVIDER NOTES
ED Provider Note    Scribed for Harry Singh M.D. by Nhung Olmedo. 7/6/2017,  8:21 PM.    CHIEF COMPLAINT  Chief Complaint   Patient presents with   • Leg Pain     [facility]  Sandy Rubio is a 52 y.o. female who presents to the Emergency Department for bilateral lower extremity pain and swelling onset yesterday. Patient went kayaking 2 days ago and sustained a significant sunburn to both legs. She was seen at Urgent Care for her symptoms, and advised to come here, reportedly out of concern for possible bilateral lower extremity DVTs. Patient stands all day at work, and is required normally to raise her legs up at night to relieve gravity dependent swelling in her lower extremities. She feels that her leg swelling is worse than it has been in the past, despite 6 years at the same job. There is an obvious line down each side of each leg, showing where she was exposed to the sun. She denies any history of DVT or PE. She is a daily smoker. She has not had a cough, chest pain, hemoptysis or any shortness of breath.       REVIEW OF SYSTEMS  See [facility] for further details.     PAST MEDICAL HISTORY   has a past medical history of Pain; Other specified disorder of intestines; Bronchitis; and Other specified symptom associated with female genital organs.    SOCIAL HISTORY  Social History     Social History Main Topics   • Smoking status: Current Every Day Smoker -- 0.50 packs/day     Types: Cigarettes      Comment: 1/2 ppd   • Alcohol Use: Yes      Comment: rare   • Drug Use: No     History   Drug Use No       SURGICAL HISTORY   has past surgical history that includes lumbar laminectomy diskectomy (1978); appendectomy; arthroscopy, knee; other neurological surg; lumbar decompression; laparoscopic lysis of adhesions (4/4/2014); salpingo oophorectomy (4/4/2014); cystoscopy (4/4/2014); and hysterectomy laparoscopy (4/4/2014).    CURRENT MEDICATIONS  Home Medications     Reviewed by Kayla Pascual R.N.  "(Registered Nurse) on 07/06/17 at 2045  Med List Status: Not Addressed    Medication Last Dose Status    cyclobenzaprine (FLEXERIL) 10 MG Tab rarely Active    estradiol (VIVELLE DOT) 0.05 MG/24HR PTTW 4/11/2017 Active    ibuprofen (MOTRIN) 200 MG Tab 4/12/2017 Active    methylPREDNISolone (MEDROL) 4 MG Tab finished Active                ALLERGIES  No Known Allergies    PHYSICAL EXAM  VITAL SIGNS: /81 mmHg  Pulse 90  Temp(Src) 36.3 °C (97.3 °F)  Resp 14  Ht 1.753 m (5' 9\")  Wt 81.9 kg (180 lb 8.9 oz)  BMI 26.65 kg/m2  SpO2 99%    Pulse ox interpretation: I interpret this pulse ox as normal.  Constitutional: Alert in no apparent distress, very anxious.  HENT: No signs of trauma, Bilateral external ears normal, Nose normal.   Eyes: Pupils are equal and reactive, Conjunctiva normal, Non-icteric.   Neck: Normal range of motion,   Skin: Warm, Dry, bilateral anterior leg pain and redness although worse in right calve, with diffuse bilateral calve tenderness. She does report pain is worse when she flexes at ankle, which may be a questionably positive Anurag's sign  Extremities: Intact distal pulses, No cyanosis.  Musculoskeletal: Good range of motion in all major joints. No major deformities noted.   Neurologic: Alert , Normal motor function, Normal sensory function, No focal deficits noted.   Psychiatric: Affect anxious and unusual-- patient seems quite anxious about these symptoms, and yet repeatedly tells me that it was not her decided to come in, she was encouraged by \"everyone\" to come in for evaluation of a clot, minimizing them, at the same time that she seems quite distressed about the possibility. Judgment fair, Mood normal.     DIAGNOSTIC STUDIES / PROCEDURES    RADIOLOGY  LE VENOUS DUPLEX (Specify in Comments Left, Right Or Bilateral)   Final Result        The radiologist's interpretation of all radiological studies have been reviewed by me.    COURSE & MEDICAL DECISION MAKING  Nursing notes, VS, " PMSFHx reviewed in chart.     8:21 PM Patient seen and examined at bedside. I have extremely low pretest probability for DVT, given that her symptoms are symmetric in bilateral and there is an obvious sunburn line. However, the patient is anxious, extremely difficult to reassure her, and unhappy with my suggestion that the ultrasound is technically unnecessary, though I've offered it by way of reassurance. Differential diagnosis includes but is not limited to sunburn, DVT though very unlikely. Ordered for Le Venous Duplex to evaluate. Patient will be treated with 600mg Motrin tablet for her symptoms. Secondary to the patient's anxiety about the possibility of a blood clot present I will order an US to rule out. I explained in detail the low risk she has as evidenced by the presentation of her symptoms. Patient was still anxious following this explanation so US was ordered along with pain medication.    9:30 PM this patient's bilateral lower extremity ultrasound was normal. I recommended to her that since her pain is almost certainly from the sunburn, that she take 600 mg of ibuprofen every 6 hours as needed until symptoms resolve, and use topical aloe vera gel. She is discharged in stable condition.     The patient will return for new or worsening symptoms and is stable at the time of discharge.    The patient is referred to a primary physician for blood pressure management, diabetic screening, and for all other preventative health concerns.    DISPOSITION:  Patient will be discharged home in stable condition.    FOLLOW UP:  No follow-up provider specified.    OUTPATIENT MEDICATIONS:  New Prescriptions    No medications on file           FINAL IMPRESSION  1. Leg pain, anterior, unspecified laterality    2. Sunburn    3. Situational anxiety    4. Unhappiness         I, Nhung Olmedo (Darshana), am scribing for, and in the presence of, Harry Singh M.D..    Electronically signed by: Nhung Olmedo (Darshana),  7/6/2017    IHarry M.D. personally performed the services described in this documentation, as scribed by Nuhng Olmedo in my presence, and it is both accurate and complete.    The note accurately reflects work and decisions made by me.  Harry Singh  7/6/2017  9:34 PM

## 2017-07-07 NOTE — DISCHARGE INSTRUCTIONS
The ultrasound of both of her lower legs was normal. There is no evidence of clot or any other abnormality. You discomfort is almost certainly from your sunburn. Take 600 mg of ibuprofen every 6 hours as needed. Aloe vera gel, applied the skin, can also be extremely helpful.     Sunburn  Sunburn is damage to the skin caused by overexposure to ultraviolet (UV) rays. People with light skin or a fair complexion may be more susceptible to sunburn. Repeated sun exposure causes early skin aging such as wrinkles and sun spots. It also increases the risk of skin cancer.  CAUSES  A sunburn is caused by getting too much UV radiation from the sun.  SYMPTOMS  · Red or pink skin.  · Soreness and swelling.  · Pain.  · Blisters.  · Peeling skin.  · Headache, fever, and fatigue if sunburn covers a large area.  TREATMENT  · Your caregiver may tell you to take certain medicines to lessen inflammation.  · Your caregiver may have you use hydrocortisone cream or spray to help with itching and inflammation.  · Your caregiver may prescribe an antibiotic cream to use on blisters.  HOME CARE INSTRUCTIONS   · Avoid further exposure to the sun.  · Cool baths and cool compresses may be helpful if used several times per day. Do not apply ice, since this may result in more damage to the skin.  · Only take over-the-counter or prescription medicines for pain, discomfort, or fever as directed by your caregiver.  · Use aloe or other over-the-counter sunburn creams or gels on your skin. Do not apply these creams or gels on blisters.  · Drink enough fluids to keep your urine clear or pale yellow.  · Do not break blisters. If blisters break, your caregiver may recommend an antibiotic cream to apply to the affected area.  PREVENTION   · Try to avoid the sun between 10:00 a.m. and 4:00 p.m. when it is the strongest.  · Apply sunscreen at least 30 minutes before exposure to the sun.  · Always wear protective hats, clothing, and sunglasses with UV  protection.  · Avoid medicines, herbs, and foods that increase your sensitivity to sunlight.  · Avoid tanning beds.  SEEK IMMEDIATE MEDICAL CARE IF:   · You have a fever.  · Your pain is uncontrolled with medicine.  · You start to vomit or have diarrhea.  · You feel faint or develop a headache with confusion.  · You develop severe blistering.  · You have a pus-like (purulent) discharge coming from the blisters.   · Your burn becomes more painful and swollen.  MAKE SURE YOU:  · Understand these instructions.  · Will watch your condition.  · Will get help right away if you are not doing well or get worse.     This information is not intended to replace advice given to you by your health care provider. Make sure you discuss any questions you have with your health care provider.     Document Released: 09/27/2006 Document Revised: 04/14/2014 Document Reviewed: 06/10/2012  [employer] Interactive Patient Education ©2016 [employer]    Musculoskeletal Pain  Musculoskeletal pain is muscle and fernie aches and pains. These pains can occur in any part of the body. Your caregiver may treat you without knowing the cause of the pain. They may treat you if blood or urine tests, X-rays, and other tests were normal.   CAUSES  There is often not a definite cause or reason for these pains. These pains may be caused by a type of germ (virus). The discomfort may also come from overuse. Overuse includes working out too hard when your body is not fit. Fernie aches also come from weather changes. Bone is sensitive to atmospheric pressure changes.  HOME CARE INSTRUCTIONS   · Ask when your test results will be ready. Make sure you get your test results.  · Only take over-the-counter or prescription medicines for pain, discomfort, or fever as directed by your caregiver. If you were given medications for your condition, do not drive, operate machinery or power tools, or sign legal documents for 24 hours. Do not drink alcohol. Do not take  sleeping pills or other medications that may interfere with treatment.  · Continue all activities unless the activities cause more pain. When the pain lessens, slowly resume normal activities. Gradually increase the intensity and duration of the activities or exercise.  · During periods of severe pain, bed rest may be helpful. Lay or sit in any position that is comfortable.  · Putting ice on the injured area.  ¨ Put ice in a bag.  ¨ Place a towel between your skin and the bag.  ¨ Leave the ice on for 15 to 20 minutes, 3 to 4 times a day.  · Follow up with your caregiver for continued problems and no reason can be found for the pain. If the pain becomes worse or does not go away, it may be necessary to repeat tests or do additional testing. Your caregiver may need to look further for a possible cause.  SEEK IMMEDIATE MEDICAL CARE IF:  · You have pain that is getting worse and is not relieved by medications.  · You develop chest pain that is associated with shortness or breath, sweating, feeling sick to your stomach (nauseous), or throw up (vomit).  · Your pain becomes localized to the abdomen.  · You develop any new symptoms that seem different or that concern you.  MAKE SURE YOU:   · Understand these instructions.  · Will watch your condition.  · Will get help right away if you are not doing well or get worse.     This information is not intended to replace advice given to you by your health care provider. Make sure you discuss any questions you have with your health care provider.     Document Released: 12/18/2006 Document Revised: 03/11/2013 Document Reviewed: 08/22/2014  [employer] Interactive Patient Education ©2016 [employer]

## 2018-06-28 ENCOUNTER — APPOINTMENT (OUTPATIENT)
Dept: ADMISSIONS | Facility: MEDICAL CENTER | Age: 53
End: 2018-06-28
Attending: ORTHOPAEDIC SURGERY
Payer: COMMERCIAL

## 2018-06-28 NOTE — OR NURSING
"Pre-admit appointment completed. \"Preparing for your procedure\" sheet given to pt along with verbal and written instructions. Pt instructed to continue regularly prescribed medications through the day before surgery. Pt instructed to take the following medications the day of surgery with a sip of water, per anesthesia protocol; if needed-flexeril.    Called dr Conley office re pt voiced concerns of not getting proper pro op visit and explanation of surgery. Pt instructed she can call to make preop visit at office.   "

## 2018-07-10 ENCOUNTER — HOSPITAL ENCOUNTER (OUTPATIENT)
Facility: MEDICAL CENTER | Age: 53
End: 2018-07-10
Attending: ORTHOPAEDIC SURGERY | Admitting: ORTHOPAEDIC SURGERY
Payer: COMMERCIAL

## 2018-07-10 VITALS
SYSTOLIC BLOOD PRESSURE: 155 MMHG | HEIGHT: 69 IN | RESPIRATION RATE: 16 BRPM | BODY MASS INDEX: 26.81 KG/M2 | TEMPERATURE: 97.8 F | DIASTOLIC BLOOD PRESSURE: 79 MMHG | OXYGEN SATURATION: 96 % | HEART RATE: 72 BPM | WEIGHT: 181 LBS

## 2018-07-10 PROCEDURE — 160041 HCHG SURGERY MINUTES - EA ADDL 1 MIN LEVEL 4: Performed by: ORTHOPAEDIC SURGERY

## 2018-07-10 PROCEDURE — 700101 HCHG RX REV CODE 250

## 2018-07-10 PROCEDURE — 700105 HCHG RX REV CODE 258: Performed by: ORTHOPAEDIC SURGERY

## 2018-07-10 PROCEDURE — 501838 HCHG SUTURE GENERAL: Performed by: ORTHOPAEDIC SURGERY

## 2018-07-10 PROCEDURE — 160002 HCHG RECOVERY MINUTES (STAT): Performed by: ORTHOPAEDIC SURGERY

## 2018-07-10 PROCEDURE — 502000 HCHG MISC OR IMPLANTS RC 0278: Performed by: ORTHOPAEDIC SURGERY

## 2018-07-10 PROCEDURE — 700102 HCHG RX REV CODE 250 W/ 637 OVERRIDE(OP)

## 2018-07-10 PROCEDURE — A9270 NON-COVERED ITEM OR SERVICE: HCPCS

## 2018-07-10 PROCEDURE — 160048 HCHG OR STATISTICAL LEVEL 1-5: Performed by: ORTHOPAEDIC SURGERY

## 2018-07-10 PROCEDURE — C1713 ANCHOR/SCREW BN/BN,TIS/BN: HCPCS | Performed by: ORTHOPAEDIC SURGERY

## 2018-07-10 PROCEDURE — 700111 HCHG RX REV CODE 636 W/ 250 OVERRIDE (IP)

## 2018-07-10 PROCEDURE — 160046 HCHG PACU - 1ST 60 MINS PHASE II: Performed by: ORTHOPAEDIC SURGERY

## 2018-07-10 PROCEDURE — 160035 HCHG PACU - 1ST 60 MINS PHASE I: Performed by: ORTHOPAEDIC SURGERY

## 2018-07-10 PROCEDURE — 502240 HCHG MISC OR SUPPLY RC 0272: Performed by: ORTHOPAEDIC SURGERY

## 2018-07-10 PROCEDURE — 160009 HCHG ANES TIME/MIN: Performed by: ORTHOPAEDIC SURGERY

## 2018-07-10 PROCEDURE — 160036 HCHG PACU - EA ADDL 30 MINS PHASE I: Performed by: ORTHOPAEDIC SURGERY

## 2018-07-10 PROCEDURE — 502581 HCHG PACK, SHOULDER ARTHROSCOPY: Performed by: ORTHOPAEDIC SURGERY

## 2018-07-10 PROCEDURE — 500028 HCHG ARTHROWAND TURBOVAC 3.5/90 SUCT.: Performed by: ORTHOPAEDIC SURGERY

## 2018-07-10 PROCEDURE — 160022 HCHG BLOCK: Performed by: ORTHOPAEDIC SURGERY

## 2018-07-10 PROCEDURE — 160025 RECOVERY II MINUTES (STATS): Performed by: ORTHOPAEDIC SURGERY

## 2018-07-10 PROCEDURE — 160029 HCHG SURGERY MINUTES - 1ST 30 MINS LEVEL 4: Performed by: ORTHOPAEDIC SURGERY

## 2018-07-10 DEVICE — IMPLANTABLE DEVICE: Type: IMPLANTABLE DEVICE | Site: SHOULDER | Status: FUNCTIONAL

## 2018-07-10 DEVICE — SUTURE ANCHOR HEALICOIL REGENESORB 5.5MM: Type: IMPLANTABLE DEVICE | Site: SHOULDER | Status: FUNCTIONAL

## 2018-07-10 RX ORDER — EPINEPHRINE 1 MG/ML(1)
AMPUL (ML) INJECTION
Status: DISCONTINUED | OUTPATIENT
Start: 2018-07-10 | End: 2018-07-10 | Stop reason: HOSPADM

## 2018-07-10 RX ORDER — GABAPENTIN 300 MG/1
CAPSULE ORAL
Status: DISCONTINUED
Start: 2018-07-10 | End: 2018-07-10 | Stop reason: HOSPADM

## 2018-07-10 RX ORDER — BUPIVACAINE HYDROCHLORIDE 2.5 MG/ML
INJECTION, SOLUTION EPIDURAL; INFILTRATION; INTRACAUDAL
Status: DISCONTINUED
Start: 2018-07-10 | End: 2018-07-10 | Stop reason: HOSPADM

## 2018-07-10 RX ORDER — MIDAZOLAM HYDROCHLORIDE 1 MG/ML
INJECTION INTRAMUSCULAR; INTRAVENOUS
Status: DISCONTINUED
Start: 2018-07-10 | End: 2018-07-10 | Stop reason: HOSPADM

## 2018-07-10 RX ORDER — ACETAMINOPHEN 500 MG
TABLET ORAL
Status: DISCONTINUED
Start: 2018-07-10 | End: 2018-07-10 | Stop reason: HOSPADM

## 2018-07-10 RX ORDER — LIDOCAINE HYDROCHLORIDE 10 MG/ML
INJECTION, SOLUTION EPIDURAL; INFILTRATION; INTRACAUDAL; PERINEURAL
Status: DISCONTINUED
Start: 2018-07-10 | End: 2018-07-10 | Stop reason: HOSPADM

## 2018-07-10 RX ORDER — SODIUM CHLORIDE, SODIUM LACTATE, POTASSIUM CHLORIDE, CALCIUM CHLORIDE 600; 310; 30; 20 MG/100ML; MG/100ML; MG/100ML; MG/100ML
INJECTION, SOLUTION INTRAVENOUS
Status: DISCONTINUED | OUTPATIENT
Start: 2018-07-10 | End: 2018-07-10 | Stop reason: HOSPADM

## 2018-07-10 RX ORDER — LIDOCAINE HYDROCHLORIDE 20 MG/ML
INJECTION, SOLUTION INFILTRATION; PERINEURAL
Status: DISCONTINUED
Start: 2018-07-10 | End: 2018-07-10 | Stop reason: HOSPADM

## 2018-07-10 RX ORDER — CELECOXIB 200 MG/1
CAPSULE ORAL
Status: DISCONTINUED
Start: 2018-07-10 | End: 2018-07-10 | Stop reason: HOSPADM

## 2018-07-10 RX ADMIN — SODIUM CHLORIDE, POTASSIUM CHLORIDE, SODIUM LACTATE AND CALCIUM CHLORIDE: 600; 310; 30; 20 INJECTION, SOLUTION INTRAVENOUS at 14:23

## 2018-07-10 RX ADMIN — FENTANYL CITRATE 50 MCG: 50 INJECTION, SOLUTION INTRAMUSCULAR; INTRAVENOUS at 17:45

## 2018-07-10 RX ADMIN — SODIUM CHLORIDE, POTASSIUM CHLORIDE, SODIUM LACTATE AND CALCIUM CHLORIDE: 600; 310; 30; 20 INJECTION, SOLUTION INTRAVENOUS at 17:06

## 2018-07-10 ASSESSMENT — PAIN SCALES - GENERAL
PAINLEVEL_OUTOF10: 5
PAINLEVEL_OUTOF10: 0
PAINLEVEL_OUTOF10: 5
PAINLEVEL_OUTOF10: 0

## 2018-07-10 NOTE — OR NURSING
1646 To PACU from OR via [facility], respirations spontaneous and non-labored. Icepack applied over c/d/i right shoulder surgical dressings, immobilizer in place. Warm fingers, brisk cap refill.  1700 Mask placed on pt due to oxygen desaturation. Jaw thrust maneuver utilized as needed. Pt not responding to verbal stimuli.   1715 No changes.  1727 Pt awake. Able to move her right index finger slightly. Pt reports 5/10 pain to her right shoulder, declines medication at this time. Pt reports this level of pain to be tolerable. Pt denies nausea, tolerating sips of water.  1738 Pt's contact updated.  1745 Pt still reporting 5/10 pain, requests IV pain medication at this time, pt medicated.  1800 Pt reports a tolerable pain level, continues to deny nausea. Pt provided with richy lee.  1815 No changes.   1828 Report to WALLY Laura.

## 2018-07-11 NOTE — OP REPORT
DATE OF SERVICE:  07/10/2018    PREOPERATIVE DIAGNOSES:  Right shoulder rotator cuff tear, proximal biceps   pathology, subacromial impingement, acromioclavicular joint arthrosis.    POSTOPERATIVE DIAGNOSES:  1.  Right shoulder complex rotator cuff tear with full thickness   musculotendinous junction cuff tear and a near full thickness tear of the   supraspinatus from the greater tuberosity.  2.  Proximal biceps splitting, fraying, partial tearing.  3.  Type 1 superior labral anterior to posterior tear with circumferential   fraying of the labrum.  4.  Synovitis of the glenohumeral joint.  5.  Subacromial impingement.  6.  Acromioclavicular joint arthrosis.    PROCEDURES PERFORMED:  1.  Right shoulder diagnostic arthroscopy with arthroscopic rotator cuff   repair.  2.  Right shoulder arthroscopic biceps tenodesis.  3.  Right shoulder arthroscopic subacromial decompression.  4.  Right shoulder arthroscopic distal clavicle resection.  5.  Right shoulder arthroscopic extensive glenohumeral joint debridement.    SURGEON:  James Conley MD    ASSISTANT:  Quiana Mathis PA-C    ANESTHESIA:  General and an interscalene nerve block.    ANESTHESIOLOGIST:  Jackie Robledo MD    IMPLANTS:  One 5.5 mm fully-threaded triple-loaded Smith and Nephew Regenesorb   Healicoil suture anchor, one #2 Ultrabraid and one 4.75 mm Smith and Nephew   Regenesorb Healicoil suture anchor.    COMPLICATIONS:  None.    DISPOSITION:  Stable to postanesthesia care unit.    INDICATIONS:  The patient is a very pleasant 53-year-old female who has had   progressive shoulder pain after an injury, which has been unresponsive to   conservative management.  The risks, benefits, alternatives, and limitations   of surgical intervention were discussed in detail.  She expressed   understanding and desired to proceed.    DESCRIPTION OF PROCEDURE:  The patient and the correct operative extremity   were identified in the preoperative area.  The shoulder was marked.   She was   brought to the operating room and the correct operative extremity was again   confirmed.  She was placed supine on the OR table where she underwent an   interscalene nerve block performed at my request for postop pain control.  She   underwent general anesthesia without complication.  Examination under   anesthesia showed full range of motion, no instability.  The shoulder was   prepped with alcohol and the subacromial space injected with 30 mL of 1%   lidocaine with epinephrine.  The shoulder was then prepped and draped in the   usual sterile fashion using ChloraPrep.  A diagnostic arthroscopy was then   performed, which showed intact articular cartilage of the humeral head and   glenoid.  There was a type 1 superior labral anterior to posterior tear.    There was circumferential fraying of the labrum.  There was moderate synovitis   in the joint.  The subscapularis was intact.  There was a near full thickness   tear of the supraspinatus extending slightly into the infraspinatus.  The   teres minor was intact.  There was moderate synovitis in the joint.  The   labrum was debrided circumferentially.  There was extensive splitting and   fraying of the intraarticular portion of the biceps with some subluxation and   the biceps was tenotomized.  Synovectomy was performed.  The partial tear of   the rotator cuff was debrided and then marked with the PDS suture.  It   comprised about 8-9 mm of the articular footprint, so very high-grade   articular sided partial tear.  Scope was then withdrawn, replaced into the   subacromial space where there was a large amount of inflamed irritated bursa.    There was fraying on the undersurface of the CA ligament.  The undersurface   of the acromion was exposed revealing a moderate subacromial spur and anterior   acromioplasty was performed taking the acromion down to a smooth flat type 1.    There was extensive arthrosis of the AC joint.  A circumferential resection   of  8-10 mm of the distal clavicle was performed.  The bony debris was removed   from the subacromial space.  The biceps was mobilized.  It was tenodesed   within the bicipital groove with a 4.75 mm anchor, a cerclage tape and a cinch   stitch.  The excess biceps was excised.  The cuff was then evaluated.  It was   an unusual tear.  There was a full thickness tear at the musculotendinous   junction medially on the cuff.  There was also significant fraying where the   PDS suture had marked the articular sided tear in the bursal side tearing   essentially almost came up to the bursal-sided tearing.  There were only a few   thin fibers remaining.  Those were taken down.  The greater tuberosity was   debrided down to bleeding bone.  Bone marrow vents placed medially.  A triple   loaded anchor was placed laterally.  First, a horizontal side-to-side stitch   was placed through the full thickness musculotendinous junction tear and that   was tied down with a sliding locking SMC knot with alternating stacked half   hitches with free #2 Ultrabraid.  Three simple mattress stitches were then   passed from the anchor through the torn cuff and all tied down securely with   sliding locking SMC knots with alternating stacked half hitches.  This   resulted in a secure repair of the rotator cuff.  Spinal needle was placed   into the subacromial space under direct vision.  The scope was withdrawn.  The   portals closed with 3-0 nylon.  The subacromial space injected with 0.5%   bupivacaine with epinephrine.  Sterile dressings were applied.  The patient   was placed into an UltraSling.  She was allowed to awake from anesthesia,   transferred to her hospital cart and taken to the postanesthesia care unit in   stable condition.  She tolerated the procedure well.  There were no immediate   complications.       ____________________________________     MANISHA KLEIN MD RD / KAREEM    DD:  07/10/2018 17:07:19  DT:  07/10/2018 17:32:26    D#:   [id number]  Job#:  [id number]

## 2018-07-11 NOTE — DISCHARGE INSTRUCTIONS
ACTIVITY: Rest and take it easy for the first 24 hours.  A responsible adult is recommended to remain with you during that time.  It is normal to feel sleepy.  We encourage you to not do anything that requires balance, judgment or coordination.    MILD FLU-LIKE SYMPTOMS ARE NORMAL. YOU MAY EXPERIENCE GENERALIZED MUSCLE ACHES, THROAT IRRITATION, HEADACHE AND/OR SOME NAUSEA.    FOR 24 HOURS DO NOT:  Drive, operate machinery or run household appliances.  Drink beer or alcoholic beverages.   Make important decisions or sign legal documents.    SPECIAL INSTRUCTIONS: Ice & elevate. Non-weight bearing. Wear immobilizer at all times except for bathing.    DIET: To avoid nausea, slowly advance diet as tolerated, avoiding spicy or greasy foods for the first day.  Add more substantial food to your diet according to your physician's instructions.  Babies can be fed formula or breast milk as soon as they are hungry.  INCREASE FLUIDS AND FIBER TO AVOID CONSTIPATION.    SURGICAL DRESSING/BATHING: May remove dressings Post op Day #2 (Thursday) and shower with wound uncovered.  Apply bandaids after shower.  Do not soak or submerge incisions for three weeks. Ice and elevate extremity.     FOLLOW-UP APPOINTMENT:  A follow-up appointment should be arranged with your doctor in 7-10 days; call to schedule.    You should CALL YOUR PHYSICIAN if you develop:  Fever greater than 101 degrees F.  Pain not relieved by medication, or persistent nausea or vomiting.  Excessive bleeding (blood soaking through dressing) or unexpected drainage from the wound.  Extreme redness or swelling around the incision site, drainage of pus or foul smelling drainage.  Inability to urinate or empty your bladder within 8 hours.  Problems with breathing or chest pain.    You should call 911 if you develop problems with breathing or chest pain.  If you are unable to contact your doctor or surgical center, you should go to the nearest emergency room or urgent care  center.  Physician's telephone #: [phone]    If any questions arise, call your doctor.  If your doctor is not available, please feel free to call the Surgical Center at [phone].  The Center is open Monday through Friday from 7AM to 7PM.  You can also call the [employer] HOTLINE open 24 hours/day, 7 days/week and speak to a nurse at [phone], or toll free at [phone].    A registered nurse may call you a few days after your surgery to see how you are doing after your procedure.    MEDICATIONS: Resume taking daily medication.  Take prescribed pain medication with food.  If no medication is prescribed, you may take non-aspirin pain medication if needed.  PAIN MEDICATION CAN BE VERY CONSTIPATING.  Take a stool softener or laxative such as senokot, pericolace, or milk of magnesia if needed.    Prescription given for preop.  Last pain medication given at n/a.    If your physician has prescribed pain medication that includes Acetaminophen (Tylenol), do not take additional Acetaminophen (Tylenol) while taking the prescribed medication.          Peripheral Nerve Block Discharge Instructions from Same Day Surgery and Inpatient :    What to Expect - Upper Extremity  · You may experience numbness and weakness in shoulder, arm and hand on the same side as your surgery  · This is normal. For some people, this may be an unpleasant sensation. Be very careful with your numb limb  · Ask for help when you need it  Shoulder Surgery Side Effects  · In addition to numbness and weakness you may experience other symptoms  · Other nerves that are close to those nerves injected can also be affected by local anesthesia  · You may experience a hoarseness in your voice  · Your breathing may feel different  · You may also notice drooping of your eyelid, pupil constriction, and decreased sweating, on the side of your surgery  · All of these side effects are normal and will resolve when the local anesthetic wears off   Prevent  "Injury  · Protect the limb like a baby  · Beware of exposing your limb to extreme heat or cold or trauma  · The limb may be injured without you noticing because it is numb  · Keep the limb elevated whenever possible  · Do not sleep on the limb  · Change the position of the limb regularly  · Avoid putting pressure on your surgical limb  Pain Control  · The initial block on the day of surgery will make your extremity feel \"numb\"  · Any consecutive injection including prior to discharge from the hospital will make your extremity feel \"numb\"  · You may feel an aching or burning when the local anesthesia starts to wear off  · Take pain pills as prescribed by your surgeon  · Call your surgeon or anesthesiologist if you do not have adequate pain control            Depression / Suicide Risk    As you are discharged from this [facility] facility, it is important to learn how to keep safe from harming yourself.    Recognize the warning signs:  · Abrupt changes in personality, positive or negative- including increase in energy   · Giving away possessions  · Change in eating patterns- significant weight changes-  positive or negative  · Change in sleeping patterns- unable to sleep or sleeping all the time   · Unwillingness or inability to communicate  · Depression  · Unusual sadness, discouragement and loneliness  · Talk of wanting to die  · Neglect of personal appearance   · Rebelliousness- reckless behavior  · Withdrawal from people/activities they love  · Confusion- inability to concentrate     If you or a loved one observes any of these behaviors or has concerns about self-harm, here's what you can do:  · Talk about it- your feelings and reasons for harming yourself  · Remove any means that you might use to hurt yourself (examples: pills, rope, extension cords, firearm)  · Get professional help from the community (Mental Health, Substance Abuse, psychological counseling)  · Do not be alone:Call your Safe Contact- someone " whom you trust who will be there for you.  · Call your local CRISIS HOTLINE 238-5458 or 904-263-5246  · Call your local Children's Mobile Crisis Response Team [place] (338) 411-7540 or www.[employer]  · Call the toll free National Suicide Prevention Hotlines   · National Suicide Prevention Lifeline 719-748-BXNO (7008)  National [employer] Line Network 800-SUICIDE (820-5152)

## 2018-07-11 NOTE — OR NURSING
Respirations easy.  Gauze and Medipore tape clean and dry to right shoulder.  Immobilizer in place.  Fingers warm, mobile, pink with brisk cap refill. OOB tolerated well.

## (undated) DEVICE — SUTURE GENERAL

## (undated) DEVICE — SPIDER SHOULDER HOLDER (12EA/BX)

## (undated) DEVICE — SODIUM CHL. IRRIGATION 0.9% 3000ML (4EA/CA 65CA/PF)

## (undated) DEVICE — GLOVE SZ 7.5 LF PROTEXIS (50PR/BX)

## (undated) DEVICE — DRAPETIBURON SHOULDER W/POUCH - (5EA/CA)

## (undated) DEVICE — TUBING PATIENT W/CONNECTOR REDEUCE (1EA)

## (undated) DEVICE — WATER IRRIGATION STERILE 1000ML (12EA/CA)

## (undated) DEVICE — PACK SHOULDER ARTHROSCOPY SM - (2EA/CA)

## (undated) DEVICE — ARTHROWAND TURBOVAC 3.5/90 SCT

## (undated) DEVICE — NEEDLE SAFETY 18 GA X 1 1/2 IN (100EA/BX)

## (undated) DEVICE — SPONGE GAUZESTER 4 X 4 4PLY - (128PK/CA)

## (undated) DEVICE — CANISTER SUCTION RIGID RED 1500CC (40EA/CA)

## (undated) DEVICE — SUTURE 1 PDS-2 PLUS CTX - (24/BX)

## (undated) DEVICE — ELECTRODE DUAL RETURN W/ CORD - (50/PK)

## (undated) DEVICE — MASK ANESTHESIA ADULT  - (100/CA)

## (undated) DEVICE — GOWN WARMING STANDARD FLEX - (30/CA)

## (undated) DEVICE — CHLORAPREP 26 ML APPLICATOR - ORANGE TINT(25/CA)

## (undated) DEVICE — GLOVE BIOGEL INDICATOR SZ 7.5 SURGICAL PF LTX - (50PR/BX 4BX/CA)

## (undated) DEVICE — SUCTION INSTRUMENT YANKAUER BULBOUS TIP W/O VENT (50EA/CA)

## (undated) DEVICE — KIT ANESTHESIA W/CIRCUIT & 3/LT BAG W/FILTER (20EA/CA)

## (undated) DEVICE — GLOVE, LITE (PAIR)

## (undated) DEVICE — SLING

## (undated) DEVICE — SHAVER4.0 AGGRESSIVE + FORMLA (5EA/BX)

## (undated) DEVICE — SHAVER 5.5 RESECTOR FORMULA (5EA/BX )

## (undated) DEVICE — TUBE CONNECTING SUCTION - CLEAR PLASTIC STERILE 72 IN (50EA/CA)

## (undated) DEVICE — HUMID-VENT HEAT AND MOISTURE EXCHANGE- (50/BX)

## (undated) DEVICE — SYRINGE 30 ML LL (56/BX)

## (undated) DEVICE — SENSOR SPO2 NEO LNCS ADHESIVE (20/BX) SEE USER NOTES

## (undated) DEVICE — PROTECTOR ULNA NERVE - (36PR/CA)

## (undated) DEVICE — ELECTRODE 850 FOAM ADHESIVE - HYDROGEL RADIOTRNSPRNT (50/PK)

## (undated) DEVICE — BAG, SPONGE COUNT 50600

## (undated) DEVICE — KIT ROOM DECONTAMINATION

## (undated) DEVICE — GLOVE BIOGEL PI INDICATOR SZ 7.5 SURGICAL PF LF -(50/BX 4BX/CA)

## (undated) DEVICE — SODIUM CHL IRRIGATION 0.9% 1000ML (12EA/CA)

## (undated) DEVICE — NEPTUNE 4 PORT MANIFOLD - (20/PK)

## (undated) DEVICE — TUBING PUMP WITH CONNECTOR REDEUCE (1EA)

## (undated) DEVICE — HEAD HOLDER JUNIOR/ADULT

## (undated) DEVICE — GLOVE BIOGEL SZ 7 SURGICAL PF LTX - (50PR/BX 4BX/CA)

## (undated) DEVICE — SUTURE 3-0 ETHILON FS-1 - (36/BX) 30 INCH

## (undated) DEVICE — GLOVE BIOGEL PI INDICATOR SZ 8.0 SURGICAL PF LF -(50/BX 4BX/CA)

## (undated) DEVICE — GLOVE BIOGEL INDICATOR SZ 8 SURGICAL PF LTX - (50/BX 4BX/CA)

## (undated) DEVICE — GLOVE BIOGEL SZ 8 SURGICAL PF LTX - (50PR/BX 4BX/CA)

## (undated) DEVICE — DRAPE U SPLIT IMP 54 X 76 - (24/CA)

## (undated) DEVICE — BLOCK

## (undated) DEVICE — TUBE E-T HI-LO CUFF 7.0MM (10EA/PK)